# Patient Record
Sex: FEMALE | Race: WHITE | Employment: FULL TIME | URBAN - NONMETROPOLITAN AREA
[De-identification: names, ages, dates, MRNs, and addresses within clinical notes are randomized per-mention and may not be internally consistent; named-entity substitution may affect disease eponyms.]

---

## 2018-01-26 ENCOUNTER — HOSPITAL ENCOUNTER (OUTPATIENT)
Dept: WOMENS IMAGING | Age: 28
Discharge: HOME | End: 2018-01-26
Payer: COMMERCIAL

## 2018-01-26 DIAGNOSIS — Z15.01 BRCA2 POSITIVE: ICD-10-CM

## 2018-01-26 DIAGNOSIS — Z15.09 BRCA2 POSITIVE: ICD-10-CM

## 2018-01-26 DIAGNOSIS — Z15.01 BRCA1 POSITIVE: ICD-10-CM

## 2018-01-26 DIAGNOSIS — Z15.09 BRCA1 POSITIVE: ICD-10-CM

## 2018-01-26 DIAGNOSIS — Z13.9 VISIT FOR SCREENING: ICD-10-CM

## 2018-01-26 PROCEDURE — 77063 BREAST TOMOSYNTHESIS BI: CPT

## 2018-01-30 ENCOUNTER — HOSPITAL ENCOUNTER (OUTPATIENT)
Dept: WOMENS IMAGING | Age: 28
Discharge: HOME | End: 2018-01-30
Payer: COMMERCIAL

## 2018-01-30 ENCOUNTER — APPOINTMENT (OUTPATIENT)
Dept: WOMENS IMAGING | Age: 28
End: 2018-01-30
Payer: COMMERCIAL

## 2018-01-30 DIAGNOSIS — R92.2 BREAST DENSITY: ICD-10-CM

## 2018-01-30 PROCEDURE — 77063 BREAST TOMOSYNTHESIS BI: CPT

## 2021-04-21 ENCOUNTER — NURSE ONLY (OUTPATIENT)
Dept: LAB | Age: 31
End: 2021-04-21

## 2021-04-21 ENCOUNTER — TELEPHONE (OUTPATIENT)
Dept: FAMILY MEDICINE CLINIC | Age: 31
End: 2021-04-21

## 2021-04-21 DIAGNOSIS — N64.4 BREAST TENDERNESS: ICD-10-CM

## 2021-04-21 DIAGNOSIS — N64.4 BREAST TENDERNESS: Primary | ICD-10-CM

## 2025-03-24 ENCOUNTER — TELEPHONE (OUTPATIENT)
Dept: FAMILY MEDICINE CLINIC | Age: 35
End: 2025-03-24

## 2025-03-24 DIAGNOSIS — R00.2 PALPITATION: Primary | ICD-10-CM

## 2025-03-24 NOTE — TELEPHONE ENCOUNTER
Call [facility] for records from ER visit this weekend    Schedule 30 day event monitor    Also schedule well check in first available